# Patient Record
Sex: FEMALE | Race: WHITE
[De-identification: names, ages, dates, MRNs, and addresses within clinical notes are randomized per-mention and may not be internally consistent; named-entity substitution may affect disease eponyms.]

---

## 2017-01-25 ENCOUNTER — HOSPITAL ENCOUNTER (EMERGENCY)
Dept: HOSPITAL 25 - ED | Age: 27
Discharge: HOME | End: 2017-01-25
Payer: COMMERCIAL

## 2017-01-25 VITALS — DIASTOLIC BLOOD PRESSURE: 66 MMHG | SYSTOLIC BLOOD PRESSURE: 110 MMHG

## 2017-01-25 DIAGNOSIS — M54.31: ICD-10-CM

## 2017-01-25 DIAGNOSIS — M54.32: Primary | ICD-10-CM

## 2017-01-25 PROCEDURE — 99282 EMERGENCY DEPT VISIT SF MDM: CPT

## 2017-01-26 NOTE — ED
Back Pain





- HPI Summary


HPI Summary: 





Patient arrives to ED with CC of back pain which started last night.  History 

of sciatic pain and lower back pain during pregnancy.  States onset was sudden, 

denies trauma.  Denies bladder or bowel incontinence.  Pain is in lower back 

bilaterally and is sharp.  Pain radiates down the right side of the leg and is 

sharp like "lightning."  Has tried Ibuprofen without relief.  She states she 

lays on the couch most of the day.  Awoke today with a 10/10 onset of pain. 

denies saddle anesthesia. denies tenderness over spine.





- History of Current Complaint


Chief Complaint: EDBackInjuryPain


Stated Complaint: LOWER BACK PAIN


Hx Obtained From: Patient


Onset/Duration: Sudden Onset


Onset/Duration: Started Days Ago


Timing: Constant


Back Pain Location: Is Discrete @ - bilateral hips at sacroiliac joints.


Severity Initially: Moderate


Severity Currently: Moderate


Pain Intensity: 6


Pain Scale Used: 0-10 Numeric


Character: Aching


Aggravating Symptom(s): Movement, Bending


Alleviating Symptom(s): Rest, Position - laying on side


Associated Signs And Symptoms: Positive: Weakness





- Risk Factors


AAA Risk Factors: Negative


TAD Risk Factors: Negative


Cauda Equina Risk Factors: Negative


Epidural Abscess Risk Factors: Negative





- Allergies/Home Medications


Allergies/Adverse Reactions: 


 Allergies











Allergy/AdvReac Type Severity Reaction Status Date / Time


 


No Known Allergies Allergy   Verified 01/25/17 20:42














PMH/Surg Hx/FS Hx/Imm Hx


Previously Healthy: Yes


Infectious Disease History: No


Infectious Disease History: 


   Denies: Traveled Outside the [place] in Last 30 Days





- Social History


Occupation: Employed Part-time


Lives: With Family


Alcohol Use: None


Substance Use Type: Reports: None


Smoking Status (MU): Never Smoked Tobacco





Review of Systems


Constitutional: Negative


Cardiovascular: Negative


Respiratory: Negative


Gastrointestinal: Negative


Genitourinary: Negative


Positive: no symptoms reported, see HPI


Positive: Arthralgia - bilateral sacroiliac pain, lower back pain


Skin: Negative


Positive: Other - no bruising noted


Neurological: Negative


Psychological: Normal


All Other Systems Reviewed And Are Negative: Yes





Physical Exam


Triage Information Reviewed: Yes


Vital Signs On Initial Exam: 


 Initial Vitals











Temp Pulse Resp BP Pulse Ox


 


 98.0 F   84   18   115/84   99 


 


 01/25/17 20:42  01/25/17 20:42  01/25/17 20:42  01/25/17 20:42  01/25/17 20:42











Vital Signs Reviewed: Yes


Appearance: Positive: Well-Appearing, No Pain Distress, Well-Nourished


Skin: Positive: Warm


Head/Face: Positive: Normal Head/Face Inspection


Eyes: Positive: Normal, EOMI, CRAIG


Neck: Positive: Supple, Nontender


Respiratory/Lung Sounds: Positive: Clear to Auscultation


Cardiovascular: Positive: Normal


Musculoskeletal: Positive: Strength/ROM Intact, Pain @ - sacoiliac joints 

bilaterally


Neurological: Positive: Normal, Sensory/Motor Intact, Speech Normal


Psychiatric: Positive: Normal





Diagnostics





- Vital Signs


 Vital Signs











  Temp Pulse Resp BP Pulse Ox


 


 01/25/17 21:58  98.6 F  62  18  110/66  98


 


 01/25/17 20:42  98.0 F  84  18  115/84  99














- Laboratory


Lab Statement: Any lab studies that have been ordered have been reviewed, and 

results considered in the medical decision making process.





Back Pain Course/Dx





- Course


Course Of Treatment: acute onset after waking. no trauma.  previous dx with 

sciatica and low back pain.  radiates to lower extremity.  no saddle anesthesia

, no bladder or bowel dysfunction.  position makes pain worse.  patient given 

muscle relaxer and naproxen.  Follow up with PCP. home exercises given for 

relief.





- Diagnoses


Differential Diagnosis/HQI/PQRI: Positive: Herniated Disc, Strain, Sprain


Provider Diagnoses: 


 Sciatic leg pain








Images





- Images


Full Body (No Head): 


  __________________________














  __________________________





 1 - pain 10/10








Discharge





- Discharge Plan


Condition: Stable


Disposition: HOME


Prescriptions: 


Cyclobenzaprine TAB* [Flexeril TAB*] 10 mg PO BID #15 tab


Naproxen TAB* [Naprosyn TAB*] 500 mg PO Q8H PRN #20 tab


 PRN Reason: Pain


Patient Education Materials:  Sciatica (ED), Lower Back Exercises (ED)


Referrals: 


No Primary Care Phys,NOPCP [Primary Care Provider] - 


Additional Instructions: 


Heat to area 4-5 times daily for pain and circulation.


Naproxen as needed for pain and inflammation.


Flexeril as needed for muscle spasm and sciatica related pain.


Tennis ball to area to massage - 4-5 times per day.


Hot soaks in the bath will help alleviate pain.


Come back to ED if symptoms fail to improve or worsen.


Follow up with your PCP.

## 2017-04-12 ENCOUNTER — HOSPITAL ENCOUNTER (INPATIENT)
Dept: HOSPITAL 25 - ED | Age: 27
LOS: 1 days | Discharge: HOME | DRG: 882 | End: 2017-04-13
Attending: PSYCHIATRY & NEUROLOGY | Admitting: PSYCHIATRY & NEUROLOGY
Payer: SELF-PAY

## 2017-04-12 DIAGNOSIS — Z63.9: ICD-10-CM

## 2017-04-12 DIAGNOSIS — M54.30: ICD-10-CM

## 2017-04-12 DIAGNOSIS — F43.25: Primary | ICD-10-CM

## 2017-04-12 DIAGNOSIS — F17.210: ICD-10-CM

## 2017-04-12 DIAGNOSIS — Z88.8: ICD-10-CM

## 2017-04-12 LAB
ALBUMIN SERPL BCG-MCNC: 4.6 G/DL (ref 3.2–5.2)
ALP SERPL-CCNC: 60 U/L (ref 34–104)
ALT SERPL W P-5'-P-CCNC: 8 U/L (ref 7–52)
ANION GAP SERPL CALC-SCNC: 8 MMOL/L (ref 2–11)
APAP SERPL-MCNC: < 15 MCG/ML
AST SERPL-CCNC: 14 U/L (ref 13–39)
BUN SERPL-MCNC: 10 MG/DL (ref 6–24)
BUN/CREAT SERPL: 12.2 (ref 8–20)
CALCIUM SERPL-MCNC: 9.8 MG/DL (ref 8.6–10.3)
CHLORIDE SERPL-SCNC: 108 MMOL/L (ref 101–111)
GLOBULIN SER CALC-MCNC: 3.4 G/DL (ref 2–4)
GLUCOSE SERPL-MCNC: 87 MG/DL (ref 70–100)
HCO3 SERPL-SCNC: 23 MMOL/L (ref 22–32)
HCT VFR BLD AUTO: 49 % (ref 35–47)
HGB BLD-MCNC: 16.5 G/DL (ref 12–16)
MANUAL ENTRY VERIFICATION: (no result)
MCH RBC QN AUTO: 29 PG (ref 27–31)
MCHC RBC AUTO-ENTMCNC: 33 G/DL (ref 31–36)
MCV RBC AUTO: 88 FL (ref 80–97)
POTASSIUM SERPL-SCNC: 3.7 MMOL/L (ref 3.5–5)
PROT SERPL-MCNC: 8 G/DL (ref 6.4–8.9)
RBC # BLD AUTO: 5.64 10^6/UL (ref 4–5.4)
SALICYLATES SERPL-MCNC: < 2.5 MG/DL (ref ?–30)
SODIUM SERPL-SCNC: 139 MMOL/L (ref 133–145)
TSH SERPL-ACNC: 1.51 MCIU/ML (ref 0.34–5.6)
UR PREG INTERNAL CONTROL: (no result)
UR PREG KIT LOT#: (no result)
WBC # BLD AUTO: 13.7 10^3/UL (ref 3.5–10.8)

## 2017-04-12 PROCEDURE — 84702 CHORIONIC GONADOTROPIN TEST: CPT

## 2017-04-12 PROCEDURE — 80307 DRUG TEST PRSMV CHEM ANLYZR: CPT

## 2017-04-12 PROCEDURE — G0480 DRUG TEST DEF 1-7 CLASSES: HCPCS

## 2017-04-12 PROCEDURE — 80320 DRUG SCREEN QUANTALCOHOLS: CPT

## 2017-04-12 PROCEDURE — 81015 MICROSCOPIC EXAM OF URINE: CPT

## 2017-04-12 PROCEDURE — 83605 ASSAY OF LACTIC ACID: CPT

## 2017-04-12 PROCEDURE — 87086 URINE CULTURE/COLONY COUNT: CPT

## 2017-04-12 PROCEDURE — 80053 COMPREHEN METABOLIC PANEL: CPT

## 2017-04-12 PROCEDURE — 80329 ANALGESICS NON-OPIOID 1 OR 2: CPT

## 2017-04-12 PROCEDURE — 84443 ASSAY THYROID STIM HORMONE: CPT

## 2017-04-12 PROCEDURE — 36415 COLL VENOUS BLD VENIPUNCTURE: CPT

## 2017-04-12 PROCEDURE — 81003 URINALYSIS AUTO W/O SCOPE: CPT

## 2017-04-12 PROCEDURE — 99406 BEHAV CHNG SMOKING 3-10 MIN: CPT

## 2017-04-12 PROCEDURE — 81025 URINE PREGNANCY TEST: CPT

## 2017-04-12 PROCEDURE — 85025 COMPLETE CBC W/AUTO DIFF WBC: CPT

## 2017-04-12 PROCEDURE — 93005 ELECTROCARDIOGRAM TRACING: CPT

## 2017-04-12 SDOH — SOCIAL STABILITY - SOCIAL INSECURITY: PROBLEM RELATED TO PRIMARY SUPPORT GROUP, UNSPECIFIED: Z63.9
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
## 2017-04-13 VITALS — DIASTOLIC BLOOD PRESSURE: 68 MMHG | SYSTOLIC BLOOD PRESSURE: 122 MMHG
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
## 2018-09-21 ENCOUNTER — HOSPITAL ENCOUNTER (EMERGENCY)
Dept: HOSPITAL 25 - UCEAST | Age: 28
Discharge: HOME | End: 2018-09-21
Payer: COMMERCIAL

## 2018-09-21 VITALS — DIASTOLIC BLOOD PRESSURE: 91 MMHG | SYSTOLIC BLOOD PRESSURE: 138 MMHG

## 2018-09-21 DIAGNOSIS — R03.0: Primary | ICD-10-CM

## 2018-09-21 DIAGNOSIS — J45.909: ICD-10-CM

## 2018-09-21 DIAGNOSIS — F17.210: ICD-10-CM

## 2018-09-21 PROCEDURE — G0463 HOSPITAL OUTPT CLINIC VISIT: HCPCS

## 2018-09-21 PROCEDURE — 99212 OFFICE O/P EST SF 10 MIN: CPT

## 2019-01-09 NOTE — UC
Respiratory Complaint HPI





- HPI Summary


HPI Summary: 


28-year-old female with history of asthma presents with 4 day history of nasal 

congestion, clear nasal discharge, right ear pain, shortness of breath, wheezing

, and a nonproductive cough.  Needing to use her albuterol inhaler every 4 

hours however she ran out of her inhaler and currently does not have a primary 

care provider if she just recently moved to the area.  Denies fever, chest pain

, abdominal pain, nausea, or vomiting.  Smokes one pack of cigarettes every 4 

days.








- History of Current Complaint


Chief Complaint: UCGeneralIllness


Stated Complaint: COUGH


Time Seen by Provider: 09/21/18 12:10


Hx Obtained From: Patient


Hx Last Menstrual Period: no period


Onset/Duration: Gradual Onset


Severity Initially: Mild


Severity Currently: Mild


Pain Intensity: 4


Alleviating Factors: Bronchodilator


Associated Signs And Symptoms: Positive: Dyspnea, Wheezing, URI, Nasal 

Congestion.  Negative: Fever, Chills, Pleuritic Chest Pain, Hemoptysis, Sinus 

Discomfort





- Allergies/Home Medications


Allergies/Adverse Reactions: 


 Allergies











Allergy/AdvReac Type Severity Reaction Status Date / Time


 


No Known Allergies Allergy   Verified 09/21/18 12:09














PMH/Surg Hx/FS Hx/Imm Hx


Previously Healthy: Yes


Respiratory History: Asthma





- Surgical History


Surgical History: None


Surgery Procedure, Year, and Place: cholecystectomy 2012





- Family History


Known Family History: Positive: Unknown - Pt denies knowing anything about her 

family. 





- Social History


Occupation: Employed Full-time


Lives: With Family


Alcohol Use: None


Substance Use Type: None


Smoking Status (MU): Heavy Every Day Tobacco Smoker


Amount Used/How Often: 1PP 4 days


Household Exposure Type: Cigarettes





- Immunization History


Most Recent Influenza Vaccination: unknown


Most Recent Pneumonia Vaccination: n/a





Review of Systems


Constitutional: Negative


Skin: Negative


Eyes: Negative


ENT: Ear Ache, Nasal Discharge


Respiratory: Shortness Of Breath, Cough, Other - wheezing


Cardiovascular: Negative


Gastrointestinal: Negative


Genitourinary: Negative


Is Patient Immunocompromised?: No


All Other Systems Reviewed And Are Negative: Yes





Physical Exam


Triage Information Reviewed: Yes


Appearance: Well-Appearing, No Pain Distress, Well-Nourished


Vital Signs: 


 Initial Vital Signs











Temp  97.5 F   09/21/18 12:10


 


Pulse  82   09/21/18 12:10


 


Resp  20   09/21/18 12:10


 


BP  138/91   09/21/18 12:10


 


Pulse Ox  99   09/21/18 12:10











Vital Signs Reviewed: Yes


Eyes: Positive: Conjunctiva Clear.  Negative: Discharge


ENT: Positive: Nasal congestion, Nasal drainage, TMs normal, Tonsillar swelling 

- 2+, Uvula midline.  Negative: Pharyngeal erythema, Tonsillar exudate, Sinus 

tenderness


Neck: Positive: Supple, Nontender, No Lymphadenopathy


Respiratory: Positive: Chest non-tender, No respiratory distress, No accessory 

muscle use, Wheezing - diffuse mild wheezing bilaterally.  Negative: Crackles, 

Rhonchi, Stridor


Cardiovascular: Positive: RRR, No Murmur, Pulses Normal, Brisk Capillary Refill


Neurological: Positive: Alert


Skin Exam: Normal





UC Diagnostic Evaluation





- Laboratory


O2 Sat by Pulse Oximetry: 99





Respiratory Course/Dx





- Course


Course Of Treatment: 28 year old female with history of asthma presents with 4 

day history of URI symptoms, SOB, and wheezing. She is presently out of her 

albuterol inhaler and has not established with a PCP. She had diffuse mild 

wheezing bilaterally on exam without respiratory distress. VSS. Since she is a 

smoker and does not have a PCP for urgent follow up, will renew her inhaler, 

start on short course of prednisone, and cover with Z-kristina.





- Differential Dx/Diagnosis


Provider Diagnoses: Acute bronchitis, asthma, elevated blood pressure reading





Discharge





- Sign-Out/Discharge


Documenting (check all that apply): Patient Departure


All imaging exams completed and their final reports reviewed: No Studies





- Discharge Plan


Condition: Stable


Disposition: HOME


Prescriptions: 


Albuterol HFA INHALER* [Ventolin HFA Inhaler*] 1 puff INH Q4H PRN #1 mdi


 PRN Reason: Sob/Wheezing


Azithromyxin KRISTINA (NF) [Z-Kristina (Zithromax) 250 mg tabs #6] 2 tab PO .TODAY, THEN 

1 DAILY #6 tab


predniSONE [Deltasone 20 MG TAB] 40 mg PO DAILY #10 tablet


Patient Education Materials:  Asthma (ED), Acute Bronchitis (ED)


Referrals: 


No Primary Care Phys,NOPCP [Primary Care Provider] - 


[facility] [place] [Outside]


Additional Instructions: 


Take azithromycin 2 tabs today then 1 tab a day for next 4 days.





Start prednisone 40 mg daily for 5 days.





Use your albuterol inhaler 2 puffs every 4-6 hours as needed for shortness of 

breath or wheezing.





Your blood pressure in the clinic today was mildly elevated. It is recommended 

that you establish with a primary care provider to have this rechecked. I have 

provided you with the number for the [facility] 

[facility] to help you arrange for follow up if needed.





Seek immediate medical attention if you develop persistent fever, chest pain, 

worsening shortness of breath, persistent wheezing despite using inhaler, or 

any worsening of symptoms.





- Billing Disposition and Condition


Condition: STABLE


Disposition: Home Name band;

## 2020-02-14 ENCOUNTER — HOSPITAL ENCOUNTER (EMERGENCY)
Dept: HOSPITAL 25 - ED | Age: 30
Discharge: HOME | End: 2020-02-14
Payer: COMMERCIAL

## 2020-02-14 VITALS — SYSTOLIC BLOOD PRESSURE: 130 MMHG | DIASTOLIC BLOOD PRESSURE: 86 MMHG
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
## 2020-02-14 DIAGNOSIS — T22.012A: Primary | ICD-10-CM
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
## 2020-02-14 DIAGNOSIS — Z23: ICD-10-CM
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
## 2020-02-14 PROCEDURE — 99282 EMERGENCY DEPT VISIT SF MDM: CPT

## 2020-02-14 PROCEDURE — 90715 TDAP VACCINE 7 YRS/> IM: CPT

## 2020-02-14 PROCEDURE — 90471 IMMUNIZATION ADMIN: CPT
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
